# Patient Record
Sex: MALE | Race: WHITE
[De-identification: names, ages, dates, MRNs, and addresses within clinical notes are randomized per-mention and may not be internally consistent; named-entity substitution may affect disease eponyms.]

---
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
## 2024-06-01 PROBLEM — Z00.00 ENCOUNTER FOR PREVENTIVE HEALTH EXAMINATION: Status: ACTIVE | Noted: 2024-06-01

## 2024-06-06 ENCOUNTER — APPOINTMENT (OUTPATIENT)
Dept: UROLOGY | Facility: CLINIC | Age: 19
End: 2024-06-06
Payer: COMMERCIAL

## 2024-06-06 VITALS
DIASTOLIC BLOOD PRESSURE: 83 MMHG | OXYGEN SATURATION: 99 % | WEIGHT: 180 LBS | SYSTOLIC BLOOD PRESSURE: 134 MMHG | HEART RATE: 77 BPM | RESPIRATION RATE: 16 BRPM | HEIGHT: 73 IN | TEMPERATURE: 98.2 F | BODY MASS INDEX: 23.86 KG/M2

## 2024-06-06 DIAGNOSIS — Z80.42 FAMILY HISTORY OF MALIGNANT NEOPLASM OF PROSTATE: ICD-10-CM

## 2024-06-06 DIAGNOSIS — N47.1 PHIMOSIS: ICD-10-CM

## 2024-06-06 DIAGNOSIS — Z82.3 FAMILY HISTORY OF STROKE: ICD-10-CM

## 2024-06-06 DIAGNOSIS — Z83.3 FAMILY HISTORY OF DIABETES MELLITUS: ICD-10-CM

## 2024-06-06 DIAGNOSIS — N50.0 ATROPHY OF TESTIS: ICD-10-CM

## 2024-06-06 LAB
BILIRUB UR QL STRIP: NORMAL
COLLECTION METHOD: NORMAL
GLUCOSE UR-MCNC: NORMAL
HCG UR QL: 0.2 EU/DL
HGB UR QL STRIP.AUTO: NORMAL
KETONES UR-MCNC: NORMAL
LEUKOCYTE ESTERASE UR QL STRIP: NORMAL
NITRITE UR QL STRIP: NORMAL
PH UR STRIP: 5.5
PROT UR STRIP-MCNC: 30
SP GR UR STRIP: 1.02

## 2024-06-06 PROCEDURE — 99204 OFFICE O/P NEW MOD 45 MIN: CPT

## 2024-06-06 PROCEDURE — 81003 URINALYSIS AUTO W/O SCOPE: CPT | Mod: QW

## 2024-06-06 RX ORDER — CLOTRIMAZOLE 10 MG/G
1 CREAM TOPICAL
Qty: 1 | Refills: 2 | Status: ACTIVE | COMMUNITY
Start: 2024-06-06 | End: 1900-01-01

## 2024-06-06 NOTE — ASSESSMENT
[FreeTextEntry1] : As far as the foreskin we could do Heineke-Mikulicz and make it a wider foreskin but he runs the risk of will scar down again he just wants it dealt with we will schedule him for circumcision.  Due to the extent possible he will do some balanitis care with some Betadine and tap water making sure to rinse the Betadine out and then dry it perhaps with Q-tips.  As far as asymmetry of the testicles I will get an ultrasound supine and upright we will see what it shows.   Risk benefits and alternatives of the circumcision were reviewed balanitis care was reviewed testicular asymmetry was reviewed

## 2024-06-06 NOTE — HISTORY OF PRESENT ILLNESS
[None] : no symptoms [FreeTextEntry1] : Abelardo is a 19-year-old male born January 3, 2005 who about 2 years ago noticed that the foreskin was slowly tightening and has progressed to the point that he cannot pull it back to clean the head of the penis and wants to know what we can do.  He has no pain he has no pus is just difficult to keep hygiene.  Blood test were done on March 1, 2024 Hemoglobin was 14.9 with a platelet count of 171,000 Comprehensive metabolic profile had a BUN of 13 with a creatinine of 1.09 liver function tests were normal Lipid panel showed a total cholesterol high normal at 182 and the LDL was high at 117 Hemoglobin A1c was 5.6 which is on the border given his family history of something he will have to watch Thyroid panel was normal Vitamin B12 was normal while vitamin D 25-hydroxy was low at 21.3

## 2024-06-06 NOTE — LETTER BODY
[Dear  ___] : Dear  [unfilled], [Consult Letter:] : I had the pleasure of evaluating your patient, [unfilled]. [Please see my note below.] : Please see my note below. [Consult Closing:] : Thank you very much for allowing me to participate in the care of this patient.  If you have any questions, please do not hesitate to contact me. [Sincerely,] : Sincerely, [FreeTextEntry2] : Pietro Beach MD [address] [place] [address]

## 2024-06-06 NOTE — PHYSICAL EXAM
[General Appearance - Well Developed] : well developed [General Appearance - Well Nourished] : well nourished [Normal Appearance] : normal appearance [Well Groomed] : well groomed [General Appearance - In No Acute Distress] : no acute distress [Heart Rate And Rhythm] : heart rate and rhythm were normal [Edema] : no peripheral edema [Respiration, Rhythm And Depth] : normal respiratory rhythm and effort [Exaggerated Use Of Accessory Muscles For Inspiration] : no accessory muscle use [Auscultation Breath Sounds / Voice Sounds] : lungs were clear to auscultation bilaterally [Abdomen Soft] : soft [Abdomen Tenderness] : non-tender [Abdomen Hernia] : no hernia was discovered [Costovertebral Angle Tenderness] : no ~M costovertebral angle tenderness [Urinary Bladder Findings] : the bladder was normal on palpation [Normal Station and Gait] : the gait and station were normal for the patient's age [] : no rash [No Focal Deficits] : no focal deficits [Oriented To Time, Place, And Person] : oriented to person, place, and time [Affect] : the affect was normal [Mood] : the mood was normal [Not Anxious] : not anxious [de-identified] : Foreskin is extremely tight perhaps a 3 mm opening I cannot examine the head of the penis.  He also has discrepancy and that the right testicle is about 35% the size of the left the left feels normal in size he has a normal vas bilaterally

## 2024-06-06 NOTE — LETTER HEADER
[FreeTextEntry3] : Héctor Kirkland MD [facility] [place] [address] [place] [address]

## 2024-06-18 ENCOUNTER — RESULT REVIEW (OUTPATIENT)
Age: 19
End: 2024-06-18

## 2024-06-18 ENCOUNTER — OUTPATIENT (OUTPATIENT)
Dept: OUTPATIENT SERVICES | Facility: HOSPITAL | Age: 19
LOS: 1 days | End: 2024-06-18
Payer: COMMERCIAL

## 2024-06-18 DIAGNOSIS — N50.0 ATROPHY OF TESTIS: ICD-10-CM

## 2024-06-18 PROCEDURE — 76870 US EXAM SCROTUM: CPT

## 2024-06-18 PROCEDURE — 76870 US EXAM SCROTUM: CPT | Mod: 26

## 2024-06-19 DIAGNOSIS — N50.0 ATROPHY OF TESTIS: ICD-10-CM

## 2024-06-24 ENCOUNTER — OUTPATIENT (OUTPATIENT)
Dept: OUTPATIENT SERVICES | Facility: HOSPITAL | Age: 19
LOS: 1 days | End: 2024-06-24
Payer: COMMERCIAL

## 2024-06-24 VITALS
HEIGHT: 74 IN | HEART RATE: 71 BPM | WEIGHT: 182.1 LBS | OXYGEN SATURATION: 100 % | TEMPERATURE: 97 F | DIASTOLIC BLOOD PRESSURE: 82 MMHG | SYSTOLIC BLOOD PRESSURE: 124 MMHG | RESPIRATION RATE: 16 BRPM

## 2024-06-24 DIAGNOSIS — N47.1 PHIMOSIS: ICD-10-CM

## 2024-06-24 DIAGNOSIS — Z78.9 OTHER SPECIFIED HEALTH STATUS: Chronic | ICD-10-CM

## 2024-06-24 DIAGNOSIS — Z01.818 ENCOUNTER FOR OTHER PREPROCEDURAL EXAMINATION: ICD-10-CM

## 2024-06-24 LAB
APPEARANCE UR: CLEAR — SIGNIFICANT CHANGE UP
BILIRUB UR-MCNC: NEGATIVE — SIGNIFICANT CHANGE UP
COLOR SPEC: YELLOW — SIGNIFICANT CHANGE UP
DIFF PNL FLD: NEGATIVE — SIGNIFICANT CHANGE UP
GLUCOSE UR QL: NEGATIVE MG/DL — SIGNIFICANT CHANGE UP
KETONES UR-MCNC: NEGATIVE MG/DL — SIGNIFICANT CHANGE UP
LEUKOCYTE ESTERASE UR-ACNC: NEGATIVE — SIGNIFICANT CHANGE UP
NITRITE UR-MCNC: NEGATIVE — SIGNIFICANT CHANGE UP
PH UR: 6 — SIGNIFICANT CHANGE UP (ref 5–8)
PROT UR-MCNC: NEGATIVE MG/DL — SIGNIFICANT CHANGE UP
SP GR SPEC: 1.02 — SIGNIFICANT CHANGE UP (ref 1–1.03)
UROBILINOGEN FLD QL: 0.2 MG/DL — SIGNIFICANT CHANGE UP (ref 0.2–1)

## 2024-06-24 PROCEDURE — 99214 OFFICE O/P EST MOD 30 MIN: CPT | Mod: 25

## 2024-06-24 PROCEDURE — 87077 CULTURE AEROBIC IDENTIFY: CPT

## 2024-06-24 PROCEDURE — 87086 URINE CULTURE/COLONY COUNT: CPT

## 2024-06-24 PROCEDURE — 87186 SC STD MICRODIL/AGAR DIL: CPT

## 2024-06-24 PROCEDURE — 81003 URINALYSIS AUTO W/O SCOPE: CPT

## 2024-06-25 DIAGNOSIS — Z01.818 ENCOUNTER FOR OTHER PREPROCEDURAL EXAMINATION: ICD-10-CM

## 2024-06-25 DIAGNOSIS — N47.1 PHIMOSIS: ICD-10-CM

## 2024-06-27 LAB
-  AMPICILLIN: SIGNIFICANT CHANGE UP
-  CIPROFLOXACIN: SIGNIFICANT CHANGE UP
-  GENTAMICIN: SIGNIFICANT CHANGE UP
-  LEVOFLOXACIN: SIGNIFICANT CHANGE UP
-  NITROFURANTOIN: SIGNIFICANT CHANGE UP
-  OXACILLIN: SIGNIFICANT CHANGE UP
-  PENICILLIN: SIGNIFICANT CHANGE UP
-  RIFAMPIN: SIGNIFICANT CHANGE UP
-  TETRACYCLINE: SIGNIFICANT CHANGE UP
-  TETRACYCLINE: SIGNIFICANT CHANGE UP
-  TRIMETHOPRIM/SULFAMETHOXAZOLE: SIGNIFICANT CHANGE UP
-  VANCOMYCIN: SIGNIFICANT CHANGE UP
-  VANCOMYCIN: SIGNIFICANT CHANGE UP
CULTURE RESULTS: ABNORMAL
METHOD TYPE: SIGNIFICANT CHANGE UP
METHOD TYPE: SIGNIFICANT CHANGE UP
ORGANISM # SPEC MICROSCOPIC CNT: ABNORMAL
ORGANISM # SPEC MICROSCOPIC CNT: ABNORMAL
ORGANISM # SPEC MICROSCOPIC CNT: SIGNIFICANT CHANGE UP
SPECIMEN SOURCE: SIGNIFICANT CHANGE UP

## 2024-07-01 ENCOUNTER — NON-APPOINTMENT (OUTPATIENT)
Age: 19
End: 2024-07-01

## 2024-07-09 ENCOUNTER — RESULT REVIEW (OUTPATIENT)
Age: 19
End: 2024-07-09

## 2024-07-09 ENCOUNTER — APPOINTMENT (OUTPATIENT)
Dept: UROLOGY | Facility: HOSPITAL | Age: 19
End: 2024-07-09

## 2024-07-09 ENCOUNTER — TRANSCRIPTION ENCOUNTER (OUTPATIENT)
Age: 19
End: 2024-07-09

## 2024-07-09 ENCOUNTER — OUTPATIENT (OUTPATIENT)
Dept: OUTPATIENT SERVICES | Facility: HOSPITAL | Age: 19
LOS: 1 days | Discharge: HOME | End: 2024-07-09
Payer: COMMERCIAL

## 2024-07-09 VITALS
HEART RATE: 89 BPM | OXYGEN SATURATION: 97 % | HEIGHT: 74 IN | DIASTOLIC BLOOD PRESSURE: 69 MMHG | WEIGHT: 182.98 LBS | RESPIRATION RATE: 16 BRPM | TEMPERATURE: 99 F | SYSTOLIC BLOOD PRESSURE: 123 MMHG

## 2024-07-09 VITALS
SYSTOLIC BLOOD PRESSURE: 109 MMHG | DIASTOLIC BLOOD PRESSURE: 59 MMHG | OXYGEN SATURATION: 99 % | HEART RATE: 66 BPM | RESPIRATION RATE: 16 BRPM

## 2024-07-09 DIAGNOSIS — Z78.9 OTHER SPECIFIED HEALTH STATUS: Chronic | ICD-10-CM

## 2024-07-09 DIAGNOSIS — N47.1 PHIMOSIS: ICD-10-CM

## 2024-07-09 PROCEDURE — 54161 CIRCUM 28 DAYS OR OLDER: CPT

## 2024-07-09 PROCEDURE — 88304 TISSUE EXAM BY PATHOLOGIST: CPT | Mod: 26

## 2024-07-09 PROCEDURE — 88304 TISSUE EXAM BY PATHOLOGIST: CPT

## 2024-07-09 RX ORDER — ACETAMINOPHEN 325 MG
1000 TABLET ORAL ONCE
Refills: 0 | Status: DISCONTINUED | OUTPATIENT
Start: 2024-07-09 | End: 2024-07-09

## 2024-07-09 RX ORDER — ONDANSETRON HYDROCHLORIDE 2 MG/ML
8 INJECTION INTRAMUSCULAR; INTRAVENOUS ONCE
Refills: 0 | Status: DISCONTINUED | OUTPATIENT
Start: 2024-07-09 | End: 2024-07-09

## 2024-07-09 RX ORDER — HYDROMORPHONE HCL 0.2 MG/ML
0.5 INJECTION, SOLUTION INTRAVENOUS
Refills: 0 | Status: DISCONTINUED | OUTPATIENT
Start: 2024-07-09 | End: 2024-07-09

## 2024-07-09 RX ORDER — OXYCODONE HYDROCHLORIDE 100 MG/5ML
5 SOLUTION ORAL ONCE
Refills: 0 | Status: DISCONTINUED | OUTPATIENT
Start: 2024-07-09 | End: 2024-07-09

## 2024-07-09 RX ORDER — METOCLOPRAMIDE 5 MG/5ML
10 SOLUTION ORAL ONCE
Refills: 0 | Status: DISCONTINUED | OUTPATIENT
Start: 2024-07-09 | End: 2024-07-09

## 2024-07-09 RX ORDER — DEXTROSE MONOHYDRATE AND SODIUM CHLORIDE 5; .3 G/100ML; G/100ML
1000 INJECTION, SOLUTION INTRAVENOUS
Refills: 0 | Status: DISCONTINUED | OUTPATIENT
Start: 2024-07-09 | End: 2024-07-09

## 2024-07-09 RX ORDER — DEXAMETHASONE 1 MG/1
8 TABLET ORAL ONCE
Refills: 0 | Status: DISCONTINUED | OUTPATIENT
Start: 2024-07-09 | End: 2024-07-09

## 2024-07-10 ENCOUNTER — NON-APPOINTMENT (OUTPATIENT)
Age: 19
End: 2024-07-10

## 2024-07-11 ENCOUNTER — APPOINTMENT (OUTPATIENT)
Dept: UROLOGY | Facility: CLINIC | Age: 19
End: 2024-07-11
Payer: COMMERCIAL

## 2024-07-11 VITALS
TEMPERATURE: 98.2 F | BODY MASS INDEX: 24.78 KG/M2 | HEART RATE: 69 BPM | WEIGHT: 187 LBS | RESPIRATION RATE: 16 BRPM | SYSTOLIC BLOOD PRESSURE: 141 MMHG | HEIGHT: 73 IN | DIASTOLIC BLOOD PRESSURE: 93 MMHG | OXYGEN SATURATION: 99 %

## 2024-07-11 DIAGNOSIS — I86.1 SCROTAL VARICES: ICD-10-CM

## 2024-07-11 DIAGNOSIS — N47.1 PHIMOSIS: ICD-10-CM

## 2024-07-11 DIAGNOSIS — N50.0 ATROPHY OF TESTIS: ICD-10-CM

## 2024-07-11 PROBLEM — Z78.9 OTHER SPECIFIED HEALTH STATUS: Chronic | Status: ACTIVE | Noted: 2024-06-24

## 2024-07-11 LAB — SURGICAL PATHOLOGY STUDY: SIGNIFICANT CHANGE UP

## 2024-07-11 PROCEDURE — 99213 OFFICE O/P EST LOW 20 MIN: CPT | Mod: 24

## 2024-07-11 PROCEDURE — G2211 COMPLEX E/M VISIT ADD ON: CPT

## 2024-07-17 DIAGNOSIS — N47.1 PHIMOSIS: ICD-10-CM
